# Patient Record
Sex: FEMALE | Race: WHITE
[De-identification: names, ages, dates, MRNs, and addresses within clinical notes are randomized per-mention and may not be internally consistent; named-entity substitution may affect disease eponyms.]

---

## 2018-10-10 ENCOUNTER — HOSPITAL ENCOUNTER (OUTPATIENT)
Dept: HOSPITAL 76 - DI | Age: 68
Discharge: HOME | End: 2018-10-10
Attending: INTERNAL MEDICINE
Payer: MEDICARE

## 2018-10-10 DIAGNOSIS — Z12.31: Primary | ICD-10-CM

## 2018-10-10 PROCEDURE — 77063 BREAST TOMOSYNTHESIS BI: CPT

## 2018-10-10 PROCEDURE — 77067 SCR MAMMO BI INCL CAD: CPT

## 2018-10-11 NOTE — MAMMOGRAPHY REPORT
Reason:  BILAT SCREEN w JOSE ALFREDO

Procedure Date:  10/10/2018   

Accession Number:  [id number] / [id number]                    

Procedure:  [facility] - Screening Mammo w/Jose Alfredo CPT Code:  

 

FULL RESULT:

 

 

EXAM: Screening Mammo w/Jose Alfredo

 

DATE: 10/10/2018 12:30 PM

 

CLINICAL HISTORY: 68-year-old female presents for screening mammogram.

 

TECHNIQUE: Bilateral CC and MLO views were obtained.

 

COMPARISON: 3/30/2016, 12/29/2014, 11/13/2014, 11/5/2012.

 

FINDINGS:

The breasts demonstrate scattered fibroglandular densities bilaterally. 

Coarse typically benign calcifications are redemonstrated. No suspicious 

masses, clustered microcalcifications, or regions of architectural 

distortion are identified.

 

IMPRESSION: Benign findings

 

RECOMMENDATION: Routine annual screening unless otherwise clinically 

indicated.

 

BIRADS CATEGORY 2: Benign findings

 

STANDARD QUALIFYING STATEMENTS:

1.  This examination was not reviewed with the aid of Computer-Aided 

Detection (CAD).

2.  A negative or benign  imaging report should not delay biopsy if 

clinically suspicious findings are present.  Consider surgical 

consultation if warrented.  More than 5% of cancers are not identified by 

imaging.

3.  Dense breasts may obscure an underlying neoplasm.

4. This examination was reviewed with the aid of 3D breast imaging 

(tomosynthesis).